# Patient Record
Sex: FEMALE | Race: WHITE | NOT HISPANIC OR LATINO
[De-identification: names, ages, dates, MRNs, and addresses within clinical notes are randomized per-mention and may not be internally consistent; named-entity substitution may affect disease eponyms.]

---

## 2020-01-16 ENCOUNTER — APPOINTMENT (OUTPATIENT)
Dept: VASCULAR SURGERY | Facility: CLINIC | Age: 30
End: 2020-01-16
Payer: COMMERCIAL

## 2020-01-16 VITALS — BODY MASS INDEX: 20.04 KG/M2 | HEIGHT: 70 IN | WEIGHT: 140 LBS

## 2020-01-16 DIAGNOSIS — Z86.59 PERSONAL HISTORY OF OTHER MENTAL AND BEHAVIORAL DISORDERS: ICD-10-CM

## 2020-01-16 DIAGNOSIS — Z78.9 OTHER SPECIFIED HEALTH STATUS: ICD-10-CM

## 2020-01-16 DIAGNOSIS — M79.89 OTHER SPECIFIED SOFT TISSUE DISORDERS: ICD-10-CM

## 2020-01-16 PROBLEM — Z00.00 ENCOUNTER FOR PREVENTIVE HEALTH EXAMINATION: Status: ACTIVE | Noted: 2020-01-16

## 2020-01-16 PROCEDURE — 99203 OFFICE O/P NEW LOW 30 MIN: CPT

## 2020-01-16 RX ORDER — ALPRAZOLAM 2 MG/1
TABLET ORAL
Refills: 0 | Status: ACTIVE | COMMUNITY

## 2020-01-16 RX ORDER — ESCITALOPRAM OXALATE 5 MG/1
TABLET, FILM COATED ORAL
Refills: 0 | Status: ACTIVE | COMMUNITY

## 2020-01-16 NOTE — PHYSICAL EXAM
[Normal Breath Sounds] : Normal breath sounds [2+] : left 2+ [Ankle Swelling (On Exam)] : present [Ankle Swelling On The Right] : mild [Varicose Veins Of Lower Extremities] : bilaterally [No Rash or Lesion] : No rash or lesion [] : bilaterally [Alert] : alert [Oriented to Person] : oriented to person [Calm] : calm [Oriented to Place] : oriented to place [de-identified] : NAD [de-identified] : NCAT [de-identified] : supple [de-identified] : soft, NT, ND; no palpable masses

## 2020-01-16 NOTE — ASSESSMENT
[FreeTextEntry1] : 28 y/o F with right leg swelling of unknown etiology. She underwent a venous DUS at [place] which was negative for DVT / SVT. She underwent consultation by a vascular surgeon in [facility], who performed a venous DUS to assess for reflux, she is unsure of the results, but will try to obtain the records.\par Concomitantly, she is undergoing an MRI of the right hip / thigh to assess for soft tissue abnormalities as per her orthopedic surgeon.\par I have encouraged her to wear compression therapy, she will obtain results of past venous duplex, and she will follow up with me in several weeks. I hope by that point we will also have results of ortho workup.

## 2020-01-16 NOTE — DATA REVIEWED
[FreeTextEntry1] : venous DUS performed at [place] during ER visit 2 days ago - no DVT or SVT, no other abnormalities seen

## 2020-01-16 NOTE — HISTORY OF PRESENT ILLNESS
[FreeTextEntry1] : 28 y/o F here for evaluation of right leg swelling.\par 28 yo F with family history of DVT and early cardiac related death (uncle) presents c/o right leg swelling. She states she has been having swelling to RLE since September and was seen by a vascular surgeon in [facility] who reported there may be some vascular insufficiency of the RLE (unclear if dx'd with vascular insufficiency vs intermittent iliac compression). She had US in November, but does not know the result and does not have the reports now. She was advised to wear compression garments, but finds that this does not fit with her lifestyle.\par \par She was seen in [place] ER two days ago for right groin pain. She went to the movies and upon standing after the 2 hour movie developed severe right groin pain. A venous DUS was negative for DVT, she was referred to my office as well as an orthopedic surgeon, whom she visited earlier today. She will be undergoing an MRI as per ortho to assess for soft tissue abnormalities. Her groin pain is improved, but swelling persists.